# Patient Record
Sex: FEMALE | Race: BLACK OR AFRICAN AMERICAN | Employment: OTHER | URBAN - METROPOLITAN AREA
[De-identification: names, ages, dates, MRNs, and addresses within clinical notes are randomized per-mention and may not be internally consistent; named-entity substitution may affect disease eponyms.]

---

## 2017-03-08 ENCOUNTER — HOSPITAL ENCOUNTER (OUTPATIENT)
Dept: MAMMOGRAPHY | Age: 62
Discharge: HOME | End: 2017-03-08
Attending: FAMILY MEDICINE
Payer: COMMERCIAL

## 2017-03-08 DIAGNOSIS — Z12.31 VISIT FOR SCREENING MAMMOGRAM: ICD-10-CM

## 2017-03-08 PROCEDURE — 77067 SCR MAMMO BI INCL CAD: CPT

## 2017-05-30 ENCOUNTER — HOSPITAL ENCOUNTER (OUTPATIENT)
Dept: GENERAL RADIOLOGY | Age: 62
Discharge: HOME | End: 2017-05-30
Attending: NURSE PRACTITIONER
Payer: COMMERCIAL

## 2017-05-30 DIAGNOSIS — R05.8 UPPER AIRWAY COUGH SYNDROME: ICD-10-CM

## 2017-05-30 PROCEDURE — 71020 XR CHEST PA LAT: CPT

## 2017-09-18 ENCOUNTER — DOCUMENTATION ONLY (OUTPATIENT)
Dept: SLEEP MEDICINE | Age: 62
End: 2017-09-18

## 2017-09-18 ENCOUNTER — TELEPHONE (OUTPATIENT)
Dept: SLEEP MEDICINE | Age: 62
End: 2017-09-18

## 2017-09-18 DIAGNOSIS — G47.33 OSA (OBSTRUCTIVE SLEEP APNEA): Primary | ICD-10-CM

## 2017-09-18 NOTE — PROGRESS NOTES
Notified patient of new dme contact information per [facility] will be closing as of 9/29/17 and no longer accepting orders

## 2017-09-18 NOTE — TELEPHONE ENCOUNTER
Patient needs new mask per her mask has split in half, last seen in office 6/2016 (Dr Ian Beckwith patient). Patient states she is doing well with no complaints. Please send order.  Thank you

## 2017-09-20 ENCOUNTER — DOCUMENTATION ONLY (OUTPATIENT)
Dept: SLEEP MEDICINE | Age: 62
End: 2017-09-20

## 2017-09-20 ENCOUNTER — TELEPHONE (OUTPATIENT)
Dept: SLEEP MEDICINE | Age: 62
End: 2017-09-20

## 2017-09-20 NOTE — TELEPHONE ENCOUNTER
Orders Placed This Encounter    AMB SUPPLY ORDER     Diagnosis: (G47.33) RAMBO (obstructive sleep apnea)  (primary encounter diagnosis)     Replacement Supplies for Positive Airway Pressure Therapy Device:   Duration of need: 99 months.  Oral/Nasal Combo Mask 1 every 3 months.  Oral Cushion Combo Mask (Replace) 2 per month.  Nasal Pillows Combo Mask (Replace) 2 per month.  Full Face Mask 1 every 3 months.  Full Face Mask Cushion 1 per month.  Nasal Cushion (Replace) 2 per month.  Nasal Pillows (Replace) 2 per month.  Nasal Interface Mask 1 every 3 months.  Headgear 1 every 6 months.  Chinstrap 1 every 6 months.  Tubing 1 every 3 months.  Filter(s) Disposable 2 per month.  Filter(s) Non-Disposable 1 every 6 months.  Oral Interface 1 every 3 months. [address] for [employer] (Replace) 1 every 6 months.  Tubing with heating element 1 every 3 months. Perform Mask Fitting per patient preference and comfort - replace as above. Christina Crum MD, FAA; NPI: [id number]    Electronically signed. Date:- 09-20-17.

## 2017-09-20 NOTE — TELEPHONE ENCOUNTER
Spoke with patient to advise need to schedule yearly appointment patient decline to schedule at this time and will call back to schedule.

## 2018-03-21 ENCOUNTER — HOSPITAL ENCOUNTER (OUTPATIENT)
Dept: MAMMOGRAPHY | Age: 63
Discharge: HOME | End: 2018-03-21
Attending: FAMILY MEDICINE
Payer: COMMERCIAL

## 2018-03-21 DIAGNOSIS — Z12.39 SCREENING BREAST EXAMINATION: ICD-10-CM

## 2018-03-21 PROCEDURE — 77067 SCR MAMMO BI INCL CAD: CPT

## 2018-10-04 ENCOUNTER — OFFICE VISIT (OUTPATIENT)
Dept: SLEEP MEDICINE | Age: 63
End: 2018-10-04

## 2018-10-04 ENCOUNTER — DOCUMENTATION ONLY (OUTPATIENT)
Dept: SLEEP MEDICINE | Age: 63
End: 2018-10-04

## 2018-10-04 VITALS
OXYGEN SATURATION: 96 % | DIASTOLIC BLOOD PRESSURE: 86 MMHG | BODY MASS INDEX: 31.47 KG/M2 | SYSTOLIC BLOOD PRESSURE: 140 MMHG | HEART RATE: 74 BPM | HEIGHT: 62 IN | WEIGHT: 171 LBS

## 2018-10-04 DIAGNOSIS — G47.33 OSA (OBSTRUCTIVE SLEEP APNEA): Primary | ICD-10-CM

## 2018-10-04 RX ORDER — AMLODIPINE BESYLATE 10 MG/1
TABLET ORAL
Refills: 1 | COMMUNITY
Start: 2018-09-10

## 2018-10-04 RX ORDER — OMEPRAZOLE 20 MG/1
CAPSULE, DELAYED RELEASE ORAL
Refills: 0 | COMMUNITY
Start: 2018-09-17

## 2018-10-04 NOTE — PROGRESS NOTES
217 [facility] [place], [address]  Tel.  [phone]  Fax. 100 [facility] 60  [place], [address] [facility]  Tel.  [phone]  Fax. [phone] [address] [facility] [place], [facility] [place]   Tel.  [phone]  Fax. [phone]         Chief Complaint       Chief Complaint   Patient presents with    Sleep Problem     Dr Quinton Lunsford pt; last seen 2016; need supplies         HPI        Karla Marc is a 63 y.o.  female seen for follow-up. She was evaluated with a sleep study which demonstrated obstructive sleep apnea characterized by an AHI of 15.2 per hour associated with minimal SaO2 of 77% responding to APAP of 10-20 cm. Compliance data downloaded and reviewed in detail with the patient today. During the past 30 days, APAP used during 29 days with the average daily use of 7 hours. CMS compliance criteria 93 %. AHI 3 per hour. 90% pressure at 15.5 cm. She has a full facemask; mask broke last evening. No Known Allergies    Current Outpatient Prescriptions   Medication Sig Dispense Refill    amLODIPine (NORVASC) 10 mg tablet TK 1 T PO QD  1    omeprazole (PRILOSEC) 20 mg capsule TK 1 C PO D  0    potassium chloride (K-DUR, KLOR-CON) 20 mEq tablet 20 mEq. 3    MULTIVITAMIN/IRON/FOLIC ACID (CENTRUM COMPLETE PO) Take  by mouth.  ascorbic acid (VITAMIN C) 500 mg tablet Take  by mouth.  doxylamine succinate (SLEEP AID, DOXYLAMINE,) 25 mg tablet Take 25 mg by mouth nightly as needed for Sleep.  valsartan (DIOVAN) 160 mg tablet Take  by mouth daily.  Trudy Extract 500 mg cap Take  by mouth.  lysine (L-LYSINE) 500 mg cap Take  by mouth.  ZINC/ECHINACEA ROOT (ZINC WITH ECHINACEA PO) Take  by mouth. She  has a past medical history of Hypertension. She  has a past surgical history that includes hx shoulder arthroscopy.     She family history includes Diabetes in an other family member; Heart Disease in an other family member; Stroke in an other family member. She  reports that she has never smoked. She has never used smokeless tobacco. She reports that she drinks alcohol. She reports that she does not use illicit drugs. Review of Systems:  Unchanged per patient      Objective:     Visit Vitals    /86    Pulse 74    Ht 5' 2\" (1.575 m)    Wt 171 lb (77.6 kg)    SpO2 96%    BMI 31.28 kg/m2     Body mass index is 31.28 kg/(m^2). Assessment:       ICD-10-CM ICD-9-CM    1. RAMBO (obstructive sleep apnea) G47.33 327.23 AMB SUPPLY ORDER     Sleep apnea responding to APAP 10-20 cm. She will continue at the current pressure settings. DME will be contacted for mask replacement. She will contact the office for specific problems. Plan:     Orders Placed This Encounter    AMB SUPPLY ORDER     Diagnosis: Obstructive Sleep Apnea ICD-10 Code (G47.33)     CPAP mask and supplies-  Patient preference, headgear, tubing, and filter;  heated humidifier; wireless modem. Remote monitoring enrollment. Xavier Lazar MD, [facility]  Diplomate, American Board of Sleep Medicine       * Patient has a history and examination consistent with the diagnosis of sleep apnea. * She was provided information on sleep apnea including coresponding risk factors and the importance of proper treatment. * Treatment options if indicated were reviewed today. Potential benefit of weight reduction was discussed. Weight reduction techniques reviewed. Xavier Lazar MD, [facility]  Electronically signed 10/04/18        This note was created using voice recognition software. Despite editing, there may be syntax errors. This note will not be viewable in [address]

## 2018-10-04 NOTE — MR AVS SNAPSHOT
[address] [facility] [id number] [phone] Patient: Be Vo MRN: [id number] Visit Information Date & Time Provider Department Dept. Phone Encounter #  
 10/4/2018  1:40 PM Alba Sadler MD [facility] 53 [id number] Follow-up Instructions Return in about 1 year (around 10/4/2019) for Compliance review. Follow-up and Disposition History Upcoming Health Maintenance Date Due Hepatitis C Screening 1955 DTaP/Tdap/Td series (1 - Tdap) 11/30/1978 PAP AKA CERVICAL CYTOLOGY 11/30/1978 Shingrix Vaccine Age 50> (1 of 2) 11/30/2007 FOBT Q 1 YEAR AGE 50-75 11/30/2007 Influenza Age 5 to Adult 8/1/2018 BREAST CANCER SCRN MAMMOGRAM 3/21/2020 Allergies as of 10/4/2018  Review Complete On: 10/4/2018 By: Alba Sadler MD  
 No Known Allergies Current Immunizations  Never Reviewed No immunizations on file. Not reviewed this visit You Were Diagnosed With   
  
 Codes Comments RAMBO (obstructive sleep apnea)    -  Primary ICD-10-CM: G47.33 
ICD-9-CM: 327.23 Vitals BP Pulse Height(growth percentile) Weight(growth percentile) SpO2 BMI  
 140/86 74 5' 2\" (1.575 m) 171 lb (77.6 kg) 96% 31.28 kg/m2 OB Status Smoking Status Menopause Never Smoker BMI and BSA Data Body Mass Index Body Surface Area  
 31.28 kg/m 2 1.84 m 2 Your Updated Medication List  
  
   
This list is accurate as of 10/4/18  3:13 PM.  Always use your most recent med list. amLODIPine 10 mg tablet Commonly known as:  Jessi Paget TK 1 T PO QD  
  
 CENTRUM COMPLETE PO Take  by mouth. L-LYSINE 500 mg Cap Generic drug:  lysine Take  by mouth. Trudy Extract 500 mg Cap Take  by mouth. omeprazole 20 mg capsule Commonly known as:  PRILOSEC TK 1 C PO D  
  
 potassium chloride 20 mEq tablet Commonly known as:  K-DUR, KLOR-CON 20 mEq. Sleep Aid (Doxylamine) 25 mg tablet Generic drug:  doxylamine succinate Take 25 mg by mouth nightly as needed for Sleep.  
  
 valsartan 160 mg tablet Commonly known as:  DIOVAN Take  by mouth daily. VITAMIN C 500 mg tablet Generic drug:  ascorbic acid (vitamin C) Take  by mouth. ZINC WITH ECHINACEA PO Take  by mouth. We Performed the Following AMB SUPPLY ORDER [[id number] Custom] Comments:  
 Diagnosis: Obstructive Sleep Apnea ICD-10 Code (G47.33) 
  
CPAP mask and supplies-  Patient preference, headgear, tubing, and filter;  heated humidifier; wireless modem. Remote monitoring enrollment. Krzysztof Rodriguez MD, Janis Villarrealomate, American Board of Sleep Medicine Follow-up Instructions Return in about 1 year (around 10/4/2019) for Compliance review. Patient Instructions Sleep Apnea: Care Instructions Your Care Instructions Sleep apnea means that you frequently stop breathing for 10 seconds or longer during sleep. It can be mild to severe, based on the number of times an hour that you stop breathing or have slowed breathing. Blocked or narrowed airways in your nose, mouth, or throat can cause sleep apnea. Your airway can become blocked when your throat muscles and tongue relax during sleep. You can treat sleep apnea at home by making lifestyle changes. You also can use a CPAP breathing machine that keeps tissues in the throat from blocking your airway. Or your doctor may suggest that you use a breathing device while you sleep. It helps keep your airway open. This could be a device that you put in your mouth. In some cases, surgery may be needed to remove enlarged tissues in the throat. Follow-up care is a key part of your treatment and safety.  Be sure to make and go to all appointments, and call your doctor if you are having problems. It's also a good idea to know your test results and keep a list of the medicines you take. How can you care for yourself at home? · Lose weight, if needed. It may reduce the number of times you stop breathing or have slowed breathing. · Sleep on your side. It may stop mild apnea. If you tend to roll onto your back, sew a pocket in the back of your pajama top. Put a tennis ball into the pocket, and stitch the pocket shut. This will help keep you from sleeping on your back. · Avoid alcohol and medicines such as sleeping pills and sedatives before bed. · Do not smoke. Smoking can make sleep apnea worse. If you need help quitting, talk to your doctor about stop-smoking programs and medicines. These can increase your chances of quitting for good. · Prop up the head of your bed 4 to 6 inches by putting bricks under the legs of the bed. · Treat breathing problems, such as a stuffy nose, caused by a cold or allergies. · Try a continuous positive airway pressure (CPAP) breathing machine if your doctor recommends it. The machine keeps your airway open when you sleep. · If CPAP does not work for you, ask your doctor if you can try other breathing machines. A bilevel positive airway pressure machine uses one type of air pressure for breathing in and another type for breathing out. Another device raises or lowers air pressure as needed while you breathe. · Talk to your doctor if: 
¨ Your nose feels dry or bleeds when you use one of these machines. You may need to increase moisture in the air. A humidifier may help. ¨ Your nose is runny or stuffy from using a breathing machine. Decongestants or a corticosteroid nasal spray may help. ¨ You are sleepy during the day and it gets in the way of the normal things you do. Do not drive when you are drowsy. When should you call for help? Watch closely for changes in your health, and be sure to contact your doctor if:   · You still have sleep apnea even though you have made lifestyle changes.  
  · You are thinking of trying a device such as CPAP.  
  · You are having problems using a CPAP or similar machine. Where can you learn more? Go to http://dilma-khai.info/. Enter V962 in the search box to learn more about \"Sleep Apnea: Care Instructions. \" Current as of: December 6, 2017 Content Version: 11.8 © 6558-6389 [employer]. Care instructions adapted under license by [employer] (which disclaims liability or warranty for this information). If you have questions about a medical condition or this instruction, always ask your healthcare professional. [address] any warranty or liability for your use of this information. Patient Instructions History Introducing \[facility] [place] [facility]\"" & HEALTH SERVICES! Dear Alyx Duvall: 
Thank you for requesting a [employer] account. Our records indicate that you already have an active [employer] account. You can access your account anytime at [email or web address] Did you know that you can access your hospital and ER discharge instructions at any time in [employer]? You can also review all of your test results from your hospital stay or ER visit. Additional Information If you have questions, please visit the Frequently Asked Questions section of the [employer] website at [email or web address] Remember, [employer] is NOT to be used for urgent needs. For medical emergencies, dial 911. Now available from your iPhone and Android! Please provide this summary of care documentation to your next provider. Your primary care clinician is listed as Dotty Fothergill. If you have any questions after today's visit, please call [phone].

## 2018-10-04 NOTE — PATIENT INSTRUCTIONS

## 2021-08-20 ENCOUNTER — DOCUMENTATION ONLY (OUTPATIENT)
Dept: SLEEP MEDICINE | Age: 66
End: 2021-08-20

## 2021-08-20 ENCOUNTER — OFFICE VISIT (OUTPATIENT)
Dept: SLEEP MEDICINE | Age: 66
End: 2021-08-20
Payer: COMMERCIAL

## 2021-08-20 VITALS
BODY MASS INDEX: 32.2 KG/M2 | OXYGEN SATURATION: 99 % | WEIGHT: 175 LBS | HEIGHT: 62 IN | DIASTOLIC BLOOD PRESSURE: 77 MMHG | SYSTOLIC BLOOD PRESSURE: 131 MMHG | HEART RATE: 67 BPM

## 2021-08-20 DIAGNOSIS — G47.33 OSA (OBSTRUCTIVE SLEEP APNEA): Primary | ICD-10-CM

## 2021-08-20 PROCEDURE — 99213 OFFICE O/P EST LOW 20 MIN: CPT | Performed by: SPECIALIST

## 2021-08-20 NOTE — PROGRESS NOTES
217 [facility]. [place], [address]  Tel.  [phone]  Fax. 100 [facility] 60  [place], [address] [facility]  Tel.  [phone]  Fax. [phone] 9250 [facility] [place]ie [place]   Tel.  [phone]  Fax. [phone]         Chief Complaint       Chief Complaint   Patient presents with    Sleep Problem     cpap follow up; last seen 2018         MANUELA Thomas is a 63 y.o. female seen for follow-up. She was last seen October 2018. She was evaluated with a sleep study which demonstrated obstructive sleep apnea characterized by an AHI of 15.2 per hour associated with minimal SaO2 of 77% responding to APAP of 10-20 cm. Compliance data downloaded and reviewed in detail with the patient today. During the past 30 days, APAP used during 29 days with the average daily use of 7.1 hours. CMS compliance criteria 97%. AHI 2.1 per hour. In general, she is doing well. At times may doze as a passenger or if inactive such as when reading or watching TV. Patient has a Respironics CPAP unit which is subject to recall. No Known Allergies    Current Outpatient Medications   Medication Sig Dispense Refill    amLODIPine (NORVASC) 10 mg tablet TK 1 T PO QD  1    omeprazole (PRILOSEC) 20 mg capsule TK 1 C PO D  0    MULTIVITAMIN/IRON/FOLIC ACID (CENTRUM COMPLETE PO) Take  by mouth.  ascorbic acid (VITAMIN C) 500 mg tablet Take  by mouth.  ZINC/ECHINACEA ROOT (ZINC WITH ECHINACEA PO) Take  by mouth.  doxylamine succinate (SLEEP AID, DOXYLAMINE,) 25 mg tablet Take 25 mg by mouth nightly as needed for Sleep.  potassium chloride (K-DUR, KLOR-CON) 20 mEq tablet 20 mEq. (Patient not taking: Reported on 8/20/2021)  3    valsartan (DIOVAN) 160 mg tablet Take  by mouth daily. (Patient not taking: Reported on 8/20/2021)      Trudy Extract 500 mg cap Take  by mouth.  (Patient not taking: Reported on 8/20/2021)      lysine (L-LYSINE) 500 mg cap Take  by mouth. (Patient not taking: Reported on 8/20/2021)          She  has a past medical history of Hypertension. She  has a past surgical history that includes hx shoulder arthroscopy. She family history includes Diabetes in an other family member; Heart Disease in an other family member; Stroke in an other family member. She  reports that she has never smoked. She has never used smokeless tobacco. She reports current alcohol use. She reports that she does not use drugs. Review of Systems:  Unchanged per patient      Objective:     Visit Vitals  /77   Pulse 67   Ht 5' 2\" (1.575 m)   Wt 175 lb (79.4 kg)   SpO2 99%   BMI 32.01 kg/m²     Body mass index is 32.01 kg/m². General:   Conversant, cooperative   Eyes:  Pupils equal and reactive, no nystagmus   Oropharynx:   Mallampati score III, tongue normal                CVS:  Normal rate, regular rhythm        Neuro:  Speech fluent, face symmetrical             Assessment:       ICD-10-CM ICD-9-CM    1. RAMBO (obstructive sleep apnea)  G47.33 327.23 AMB SUPPLY ORDER     Sleep disordered breathing, responding to APAP 10-20 cm. CPAP unit will be upgraded. She was advised of current recall on [employer] units. she is compliant with PAP therapy and PAP continues to benefit patient and remains necessary for control of her sleep apnea. Plan:     Orders Placed This Encounter    AMB SUPPLY ORDER     Diagnosis: Obstructive Sleep Apnea ICD-10 Code (G47.33)     Positive Airway Pressure Therapy: Duration of need: 99 months. ResMed APAP Device: Minimum Pressure: 10 cmH2O, Maximum Pressure: 20 cmH2O. CPAP mask -  Patient preference, headgear, heated tubing, and filter;  heated humidifier. Wireless modem. Remote monitoring enrollment.  Oral/Nasal Combo Mask 1 every 3 months.  Oral Cushion Combo Mask (Replace) 2 per month.  Nasal Pillows Combo Mask (Replace) 2 per month.    Full Face Mask 1 every 3 months.  Full Face Mask Cushion 1 per month.  Nasal Cushion (Replace) 2 per month.  Nasal Pillows (Replace) 2 per month.  Nasal Interface Mask 1 every 3 months.  Headgear 1 every 6 months.  Chinstrap 1 every 6 months.  Tubing 1 every 3 months.  Filter(s) Disposable 2 per month.  Filter(s) Non-Disposable 1 every 6 months.  Oral Interface 1 every 3 months. [address] for [employer] (Replace) 1 every 6 months.  Tubing with heating element 1 every 3 months.                 Billie Pisano MD, [facility]  Diplomate, American Board of Sleep Medicine  NPI [id number]  Electronically signed 8/20/21       *A copy of compliance data was provided to the patient and reviewed in detail. *CPAP will becontinued at the above pressure settings. The patient is to contact the office if there are problems with either mask or pressure settings. Follow-up will be scheduled at which time compliance data will be reviewed. * Patient has a history and examination consistent with the diagnosis of sleep apnea. * She was provided information on sleep apnea including corresponding risk factors and the importance of proper treatment. * Treatment options if indicated were reviewed today. Billie Pisano MD, [facility]  Electronically signed 08/20/21        This note was created using voice recognition software. Despite editing, there may be syntax errors. This note will not be viewable in [address]

## 2022-06-09 ENCOUNTER — DOCUMENTATION ONLY (OUTPATIENT)
Dept: SLEEP MEDICINE | Age: 67
End: 2022-06-09